# Patient Record
Sex: FEMALE
[De-identification: names, ages, dates, MRNs, and addresses within clinical notes are randomized per-mention and may not be internally consistent; named-entity substitution may affect disease eponyms.]

---

## 2018-08-03 ENCOUNTER — RESULT REVIEW (OUTPATIENT)
Age: 48
End: 2018-08-03

## 2018-08-15 ENCOUNTER — RESULT REVIEW (OUTPATIENT)
Age: 48
End: 2018-08-15

## 2020-12-11 PROBLEM — Z00.00 ENCOUNTER FOR PREVENTIVE HEALTH EXAMINATION: Status: ACTIVE | Noted: 2020-12-11

## 2021-02-04 ENCOUNTER — APPOINTMENT (OUTPATIENT)
Dept: OTOLARYNGOLOGY | Facility: CLINIC | Age: 51
End: 2021-02-04
Payer: COMMERCIAL

## 2021-02-04 VITALS — DIASTOLIC BLOOD PRESSURE: 88 MMHG | HEART RATE: 71 BPM | SYSTOLIC BLOOD PRESSURE: 134 MMHG

## 2021-02-04 DIAGNOSIS — H90.2 CONDUCTIVE HEARING LOSS, UNSPECIFIED: ICD-10-CM

## 2021-02-04 DIAGNOSIS — Z84.1 FAMILY HISTORY OF DISORDERS OF KIDNEY AND URETER: ICD-10-CM

## 2021-02-04 DIAGNOSIS — Z83.3 FAMILY HISTORY OF DIABETES MELLITUS: ICD-10-CM

## 2021-02-04 DIAGNOSIS — H80.92 UNSPECIFIED OTOSCLEROSIS, LEFT EAR: ICD-10-CM

## 2021-02-04 PROCEDURE — 92567 TYMPANOMETRY: CPT

## 2021-02-04 PROCEDURE — 99072 ADDL SUPL MATRL&STAF TM PHE: CPT

## 2021-02-04 PROCEDURE — 92557 COMPREHENSIVE HEARING TEST: CPT

## 2021-02-04 PROCEDURE — 99204 OFFICE O/P NEW MOD 45 MIN: CPT

## 2021-02-05 PROBLEM — H90.2 CONDUCTIVE HEARING LOSS DUE TO DISORDER OF MIDDLE EAR: Status: ACTIVE | Noted: 2021-02-05

## 2021-02-05 NOTE — HISTORY OF PRESENT ILLNESS
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 Pt with HL dx in 2020- notes having HL for the last 2 years- is progressively worsening in the last 2 years. Pt with both parents with HL, later in life- no one pursued HAs. Last audio done 10/22/2020 at [facility]. Pt denies otalgia, otorrhea, ear infections- infrequent itchiness in the left ear- hx of seasonal allergies (no meds taken regularly).  Occasional vertigo - 1-2 x per month 10 min max. No family hx HL - no kids.No headaches.

## 2021-02-05 NOTE — CONSULT LETTER
[Dear  ___] : Dear  [unfilled], [Consult Letter:] : I had the pleasure of evaluating your patient, [unfilled]. [Please see my note below.] : Please see my note below. [Consult Closing:] : Thank you very much for allowing me to participate in the care of this patient.  If you have any questions, please do not hesitate to contact me. [Sincerely,] : Sincerely, [FreeTextEntry2] : Cuong Rhoades MD [FreeTextEntry3] : Avery Jovel MD, FACS\par Chair of Otolaryngology, [facility]\par Professor of Otolaryngology & Molecular Medicine, [employer] at [facility]\par \par

## 2021-02-05 NOTE — PHYSICAL EXAM
[Midline] : trachea located in midline position [Normal] : no rashes [de-identified] : montoya AS A<B AS; A>B AD

## 2021-02-05 NOTE — REASON FOR VISIT
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

## 2021-02-05 NOTE — DATA REVIEWED
[de-identified] : Hearing WNL, AD\par Moderate-severe rising to moderate CHL with mixed component at 2kHz, AS\par Absent ipsi/contra reflexes

## 2021-02-19 ENCOUNTER — NON-APPOINTMENT (OUTPATIENT)
Age: 51
End: 2021-02-19